# Patient Record
Sex: MALE | Race: WHITE | Employment: FULL TIME | URBAN - METROPOLITAN AREA
[De-identification: names, ages, dates, MRNs, and addresses within clinical notes are randomized per-mention and may not be internally consistent; named-entity substitution may affect disease eponyms.]

---

## 2024-10-11 ENCOUNTER — HOSPITAL ENCOUNTER (EMERGENCY)
Age: 44
Discharge: HOME | End: 2024-10-11
Attending: EMERGENCY MEDICINE

## 2024-10-11 VITALS
RESPIRATION RATE: 18 BRPM | DIASTOLIC BLOOD PRESSURE: 88 MMHG | WEIGHT: 160 LBS | BODY MASS INDEX: 22.9 KG/M2 | SYSTOLIC BLOOD PRESSURE: 152 MMHG | OXYGEN SATURATION: 99 % | HEART RATE: 95 BPM | TEMPERATURE: 98.2 F | HEIGHT: 70 IN

## 2024-10-11 DIAGNOSIS — H61.23 BILATERAL IMPACTED CERUMEN: Primary | ICD-10-CM

## 2024-10-11 PROCEDURE — 69209 REMOVE IMPACTED EAR WAX UNI: CPT

## 2024-10-11 PROCEDURE — 99282 EMERGENCY DEPT VISIT SF MDM: CPT

## 2024-10-11 ASSESSMENT — PAIN SCALES - GENERAL: PAINLEVEL_OUTOF10: 4

## 2024-10-11 ASSESSMENT — PAIN - FUNCTIONAL ASSESSMENT: PAIN_FUNCTIONAL_ASSESSMENT: 0-10

## 2024-10-11 ASSESSMENT — LIFESTYLE VARIABLES
HOW MANY STANDARD DRINKS CONTAINING ALCOHOL DO YOU HAVE ON A TYPICAL DAY: PATIENT DOES NOT DRINK
HOW OFTEN DO YOU HAVE A DRINK CONTAINING ALCOHOL: NEVER

## 2024-10-11 NOTE — ED PROVIDER NOTES
EMERGENCY DEPARTMENT ENCOUNTER    Pt Name: Carlos A Bauer  MRN: [id number]  Birthdate 1980  Date of evaluation: 10/11/24  CHIEF COMPLAINT       Chief Complaint   Patient presents with    Otalgia     Left ear pain that began Wednesday morning. Pt states he noticed some difficulty hearing out of that ear and then he began having some pain. Pt denies any other symptoms. Pt states he took the day off to come here and find out what was wrong     HISTORY OF PRESENT ILLNESS   Presents to the ED for evaluation of left ear pain, decreased hearing x 3 days.  Pt states his ear feels clogged and he will get some popping.  Denies ear drainage or bleeding, headache, dizziness, sore throat, congestion, sore throat, cough, cp, sob, vomiting.  He has not tried anything for symptoms.  No other complaints.     The history is provided by the patient.           PASTMEDICAL HISTORY   History reviewed. No pertinent past medical history.  Past Problem List  There is no problem list on file for this patient.    SURGICAL HISTORY     History reviewed. No pertinent surgical history.  CURRENT MEDICATIONS       Previous Medications    No medications on file     ALLERGIES     has No Known Allergies.  FAMILY HISTORY     has no family status information on file.      SOCIAL HISTORY       Social History     Tobacco Use    Smoking status: Never    Smokeless tobacco: Never   Substance Use Topics    Alcohol use: Not Currently    Drug use: Yes     Types: Marijuana (Weed)     PHYSICAL EXAM     INITIAL VITALS: BP (!) 152/88   Pulse 95   Temp 98.2 °F (36.8 °C) (Oral)   Resp 18   Ht 1.778 m (5' 10\")   Wt 72.6 kg (160 lb)   SpO2 99%   BMI 22.96 kg/m²    Physical Exam  Vitals and nursing note reviewed.   Constitutional:       General: He is awake.      Appearance: Normal appearance. He is well-developed, well-groomed and normal weight.   HENT:      Head: Normocephalic and atraumatic.      Right Ear: There is impacted cerumen.      Left Ear: There

## 2024-10-11 NOTE — ED PROVIDER NOTES
[facility] ED  eMERGENCY dEPARTMENT eNCOUnter   Independent Attestation     Pt Name: Carlos A Bauer  MRN: [id number]  Birthdate 1980  Date of evaluation: 10/11/24   Carlos A Bauer is a 44 y.o. male who presents with Otalgia (Left ear pain that began Wednesday morning. Pt states he noticed some difficulty hearing out of that ear and then he began having some pain. Pt denies any other symptoms. Pt states he took the day off to come here and find out what was wrong)    Vitals:   Vitals:    10/11/24 1326   BP: (!) 152/88   Pulse: 95   Resp: 18   Temp: 98.2 °F (36.8 °C)   TempSrc: Oral   SpO2: 99%   Weight: 72.6 kg (160 lb)   Height: 1.778 m (5' 10\")     Impression:   1. Bilateral impacted cerumen      I was personally available for consultation in the Emergency Department. I have reviewed the chart and agree with the documentation as recorded by the MLP, including the assessment, treatment plan and disposition.  Junior Iqbal MD  Attending Emergency  Physician                  Junior Iqbal MD  10/11/24 3132

## 2024-10-11 NOTE — DISCHARGE INSTRUCTIONS
Please follow up with the PCP. Recommend return to the ED if you develop any worsening ear pain, drainage, bleeding, hearing loss, headaches, dizziness, vomiting, fevers or any other concerning symptoms.